# Patient Record
Sex: FEMALE | Race: WHITE | Employment: UNEMPLOYED | URBAN - NONMETROPOLITAN AREA
[De-identification: names, ages, dates, MRNs, and addresses within clinical notes are randomized per-mention and may not be internally consistent; named-entity substitution may affect disease eponyms.]

---

## 2021-08-24 ENCOUNTER — OFFICE VISIT (OUTPATIENT)
Dept: FAMILY MEDICINE CLINIC | Facility: CLINIC | Age: 61
End: 2021-08-24
Payer: MEDICAID

## 2021-08-24 VITALS
OXYGEN SATURATION: 97 % | HEART RATE: 87 BPM | WEIGHT: 249 LBS | DIASTOLIC BLOOD PRESSURE: 82 MMHG | BODY MASS INDEX: 40.99 KG/M2 | TEMPERATURE: 98 F | HEIGHT: 65.5 IN | RESPIRATION RATE: 20 BRPM | SYSTOLIC BLOOD PRESSURE: 126 MMHG

## 2021-08-24 DIAGNOSIS — E66.01 MORBID OBESITY WITH BODY MASS INDEX OF 40.0-44.9 IN ADULT (HCC): ICD-10-CM

## 2021-08-24 DIAGNOSIS — Z13.29 SCREENING FOR THYROID DISORDER: ICD-10-CM

## 2021-08-24 DIAGNOSIS — M16.12 PRIMARY OSTEOARTHRITIS OF LEFT HIP: Primary | ICD-10-CM

## 2021-08-24 DIAGNOSIS — Z13.220 SCREENING, LIPID: ICD-10-CM

## 2021-08-24 DIAGNOSIS — Z13.1 SCREENING FOR DIABETES MELLITUS: ICD-10-CM

## 2021-08-24 DIAGNOSIS — Z13.0 SCREENING, ANEMIA, DEFICIENCY, IRON: ICD-10-CM

## 2021-08-24 DIAGNOSIS — I87.2 VENOUS INSUFFICIENCY OF BOTH LOWER EXTREMITIES: ICD-10-CM

## 2021-08-24 DIAGNOSIS — Z12.31 ENCOUNTER FOR MAMMOGRAM TO ESTABLISH BASELINE MAMMOGRAM: ICD-10-CM

## 2021-08-24 DIAGNOSIS — Z00.00 LABORATORY EXAM ORDERED AS PART OF ROUTINE GENERAL MEDICAL EXAMINATION: ICD-10-CM

## 2021-08-24 DIAGNOSIS — Z12.11 SCREEN FOR COLON CANCER: ICD-10-CM

## 2021-08-24 PROCEDURE — 3079F DIAST BP 80-89 MM HG: CPT | Performed by: FAMILY MEDICINE

## 2021-08-24 PROCEDURE — 99204 OFFICE O/P NEW MOD 45 MIN: CPT | Performed by: FAMILY MEDICINE

## 2021-08-24 PROCEDURE — 3008F BODY MASS INDEX DOCD: CPT | Performed by: FAMILY MEDICINE

## 2021-08-24 PROCEDURE — 3074F SYST BP LT 130 MM HG: CPT | Performed by: FAMILY MEDICINE

## 2021-08-24 NOTE — PATIENT INSTRUCTIONS
I reviewed age appropriate preventative health screening exams as well as immunizations.   I have asked the patient to obtain any prior records, specifically recent orthopedic evaluation  I have asked the patient to follow-up for fasting labs, orders placed

## 2021-08-24 NOTE — PROGRESS NOTES
Estela Hagen is a 59year old female. Patient presents with:  Establish Care    Moved from [facility]  HPI:   Lives in [place], still drives  Pt has not any preventative care, has been going a public health clinic  Patient did see Dr. Danny Heck for a vascul leg    ASSESSMENT AND PLAN:     Primary osteoarthritis of left hip  (primary encounter diagnosis)  Morbid obesity with body mass index of 40.0-44.9 in adult (hcc)  Venous insufficiency of both lower extremities  Encounter for mammogram to establish baselin

## 2021-08-26 ENCOUNTER — MED REC SCAN ONLY (OUTPATIENT)
Dept: FAMILY MEDICINE CLINIC | Facility: CLINIC | Age: 61
End: 2021-08-26

## 2021-10-01 ENCOUNTER — TELEPHONE (OUTPATIENT)
Dept: FAMILY MEDICINE CLINIC | Facility: CLINIC | Age: 61
End: 2021-10-01

## 2021-10-01 RX ORDER — MELOXICAM 15 MG/1
15 TABLET ORAL DAILY
COMMUNITY
Start: 2021-09-13

## 2021-10-01 NOTE — TELEPHONE ENCOUNTER
Detailed message left with patient regarding results. Instructed to call back if she has any questions or concerns.

## 2021-10-01 NOTE — TELEPHONE ENCOUNTER
Please advise patient that her follow-up mammogram showed a probable benign cyst in the right breast.  A 6-month follow-up right breast mammogram and ultrasound is recommended      Jenifer Garza.  Norris Arias

## 2023-02-23 ENCOUNTER — PATIENT OUTREACH (OUTPATIENT)
Dept: FAMILY MEDICINE CLINIC | Facility: CLINIC | Age: 63
End: 2023-02-23

## 2024-11-22 ENCOUNTER — PATIENT OUTREACH (OUTPATIENT)
Dept: CASE MANAGEMENT | Age: 64
End: 2024-11-22

## 2025-05-29 NOTE — PROCEDURES
The office order for PCP removal request is Approved and finalized on November 22, 2024.    Removed Ryan Vitale DO as the patient's Primary Care Physician     "Name: Fletcher Hanson      : 1946      MRN: [id number]  Encounter Provider: Mark Jefferson MD  Encounter Date: 2025   Encounter department: [facility] INTERNAL MEDICINE [place]  :  Assessment & Plan  Rash  At this point, does not look like shingles.  She will watch for now.  I sent in the Valtrex because the weekend is approaching.  She is a retired nurse and knows what to look for.  If it really seems to be turning into shingles, she will start the Valtrex.  Otherwise consider steroids for local irritation, contact dermatitis.  Orders:  •  valACYclovir (VALTREX) 1,000 mg tablet; Take 1 tablet (1,000 mg total) by mouth 3 (three) times a day for 7 days           History of Present Illness   Patient comes in today complaining of rash on her left upper abdomen that she wants to make sure is not shingles.  She has no pain in the area.  She notes a similar irritation where her breast touches that area of her abdomen.  No paresthesias in the area.      Review of Systems   Skin:  Positive for rash.       Objective   /70 (BP Location: Left arm, Patient Position: Sitting, Cuff Size: Standard)   Ht 5' 1\" (1.549 m)   BMI 25.70 kg/m²      Physical Exam  Vitals and nursing note reviewed.     Skin:     Findings: Rash (Small, 1-2 cm area of redness with some vesicles.) present.     Neurological:      Comments: No hypersensitivity in the area of the rash       "